# Patient Record
Sex: FEMALE | Race: BLACK OR AFRICAN AMERICAN | Employment: FULL TIME | URBAN - METROPOLITAN AREA
[De-identification: names, ages, dates, MRNs, and addresses within clinical notes are randomized per-mention and may not be internally consistent; named-entity substitution may affect disease eponyms.]

---

## 2022-01-28 ENCOUNTER — HOSPITAL ENCOUNTER (OUTPATIENT)
Dept: PREADMISSION TESTING | Age: 57
Discharge: HOME | End: 2022-01-28
Attending: INTERNAL MEDICINE
Payer: COMMERCIAL

## 2022-01-28 ENCOUNTER — TRANSCRIBE ORDER (OUTPATIENT)
Dept: REGISTRATION | Age: 57
End: 2022-01-28

## 2022-01-28 DIAGNOSIS — U07.1 COVID-19: ICD-10-CM

## 2022-01-28 DIAGNOSIS — U07.1 COVID-19: Primary | ICD-10-CM

## 2022-01-28 LAB
SARS-COV-2, XPLCVT: NOT DETECTED
SOURCE, COVRS: NORMAL

## 2022-01-28 PROCEDURE — U0005 INFEC AGEN DETEC AMPLI PROBE: HCPCS

## 2022-02-02 ENCOUNTER — ANESTHESIA (OUTPATIENT)
Dept: ENDOSCOPY | Age: 57
End: 2022-02-02
Payer: COMMERCIAL

## 2022-02-02 ENCOUNTER — HOSPITAL ENCOUNTER (OUTPATIENT)
Age: 57
Setting detail: OUTPATIENT SURGERY
Discharge: HOME | End: 2022-02-02
Attending: INTERNAL MEDICINE | Admitting: INTERNAL MEDICINE
Payer: COMMERCIAL

## 2022-02-02 ENCOUNTER — ANESTHESIA EVENT (OUTPATIENT)
Dept: ENDOSCOPY | Age: 57
End: 2022-02-02
Payer: COMMERCIAL

## 2022-02-02 VITALS
WEIGHT: 265 LBS | TEMPERATURE: 99.1 F | HEART RATE: 99 BPM | OXYGEN SATURATION: 97 % | SYSTOLIC BLOOD PRESSURE: 130 MMHG | DIASTOLIC BLOOD PRESSURE: 89 MMHG | BODY MASS INDEX: 46.95 KG/M2 | RESPIRATION RATE: 16 BRPM | HEIGHT: 63 IN

## 2022-02-02 PROCEDURE — 76060000031 HC ANESTHESIA FIRST 0.5 HR: Performed by: INTERNAL MEDICINE

## 2022-02-02 PROCEDURE — 2709999900 HC NON-CHARGEABLE SUPPLY: Performed by: INTERNAL MEDICINE

## 2022-02-02 PROCEDURE — 74011250636 HC RX REV CODE- 250/636: Performed by: NURSE ANESTHETIST, CERTIFIED REGISTERED

## 2022-02-02 PROCEDURE — 88305 TISSUE EXAM BY PATHOLOGIST: CPT

## 2022-02-02 PROCEDURE — 76040000019: Performed by: INTERNAL MEDICINE

## 2022-02-02 PROCEDURE — 77030029384 HC SNR POLYP CAPTVR II BSC -B: Performed by: INTERNAL MEDICINE

## 2022-02-02 PROCEDURE — 74011000250 HC RX REV CODE- 250: Performed by: NURSE ANESTHETIST, CERTIFIED REGISTERED

## 2022-02-02 RX ORDER — FLUMAZENIL 0.1 MG/ML
0.2 INJECTION INTRAVENOUS
Status: DISCONTINUED | OUTPATIENT
Start: 2022-02-02 | End: 2022-02-02 | Stop reason: HOSPADM

## 2022-02-02 RX ORDER — PROPOFOL 10 MG/ML
INJECTION, EMULSION INTRAVENOUS AS NEEDED
Status: DISCONTINUED | OUTPATIENT
Start: 2022-02-02 | End: 2022-02-02 | Stop reason: HOSPADM

## 2022-02-02 RX ORDER — DEXTROMETHORPHAN/PSEUDOEPHED 2.5-7.5/.8
1.2 DROPS ORAL
Status: DISCONTINUED | OUTPATIENT
Start: 2022-02-02 | End: 2022-02-02 | Stop reason: HOSPADM

## 2022-02-02 RX ORDER — EPINEPHRINE 0.1 MG/ML
1 INJECTION INTRACARDIAC; INTRAVENOUS
Status: DISCONTINUED | OUTPATIENT
Start: 2022-02-02 | End: 2022-02-02 | Stop reason: HOSPADM

## 2022-02-02 RX ORDER — SODIUM CHLORIDE 0.9 % (FLUSH) 0.9 %
5-40 SYRINGE (ML) INJECTION EVERY 8 HOURS
Status: DISCONTINUED | OUTPATIENT
Start: 2022-02-02 | End: 2022-02-02 | Stop reason: HOSPADM

## 2022-02-02 RX ORDER — ATROPINE SULFATE 0.1 MG/ML
0.5 INJECTION INTRAVENOUS
Status: DISCONTINUED | OUTPATIENT
Start: 2022-02-02 | End: 2022-02-02 | Stop reason: HOSPADM

## 2022-02-02 RX ORDER — SODIUM CHLORIDE 9 MG/ML
50 INJECTION, SOLUTION INTRAVENOUS CONTINUOUS
Status: DISCONTINUED | OUTPATIENT
Start: 2022-02-02 | End: 2022-02-02 | Stop reason: HOSPADM

## 2022-02-02 RX ORDER — LIDOCAINE HYDROCHLORIDE 20 MG/ML
INJECTION, SOLUTION EPIDURAL; INFILTRATION; INTRACAUDAL; PERINEURAL AS NEEDED
Status: DISCONTINUED | OUTPATIENT
Start: 2022-02-02 | End: 2022-02-02 | Stop reason: HOSPADM

## 2022-02-02 RX ORDER — LOSARTAN POTASSIUM 25 MG/1
25 TABLET ORAL DAILY
COMMUNITY

## 2022-02-02 RX ORDER — SODIUM CHLORIDE 0.9 % (FLUSH) 0.9 %
5-40 SYRINGE (ML) INJECTION AS NEEDED
Status: DISCONTINUED | OUTPATIENT
Start: 2022-02-02 | End: 2022-02-02 | Stop reason: HOSPADM

## 2022-02-02 RX ORDER — NALOXONE HYDROCHLORIDE 0.4 MG/ML
0.4 INJECTION, SOLUTION INTRAMUSCULAR; INTRAVENOUS; SUBCUTANEOUS
Status: DISCONTINUED | OUTPATIENT
Start: 2022-02-02 | End: 2022-02-02 | Stop reason: HOSPADM

## 2022-02-02 RX ORDER — ATORVASTATIN CALCIUM 10 MG/1
10 TABLET, FILM COATED ORAL DAILY
COMMUNITY

## 2022-02-02 RX ORDER — SODIUM CHLORIDE 9 MG/ML
INJECTION, SOLUTION INTRAVENOUS
Status: DISCONTINUED | OUTPATIENT
Start: 2022-02-02 | End: 2022-02-02 | Stop reason: HOSPADM

## 2022-02-02 RX ADMIN — PROPOFOL 50 MG: 10 INJECTION, EMULSION INTRAVENOUS at 09:19

## 2022-02-02 RX ADMIN — PROPOFOL 50 MG: 10 INJECTION, EMULSION INTRAVENOUS at 09:26

## 2022-02-02 RX ADMIN — PROPOFOL 50 MG: 10 INJECTION, EMULSION INTRAVENOUS at 09:31

## 2022-02-02 RX ADMIN — PROPOFOL 75 MG: 10 INJECTION, EMULSION INTRAVENOUS at 09:17

## 2022-02-02 RX ADMIN — LIDOCAINE HYDROCHLORIDE 50 MG: 20 INJECTION, SOLUTION EPIDURAL; INFILTRATION; INTRACAUDAL; PERINEURAL at 09:17

## 2022-02-02 RX ADMIN — PROPOFOL 25 MG: 10 INJECTION, EMULSION INTRAVENOUS at 09:35

## 2022-02-02 RX ADMIN — SODIUM CHLORIDE: 900 INJECTION, SOLUTION INTRAVENOUS at 09:01

## 2022-02-02 RX ADMIN — PROPOFOL 50 MG: 10 INJECTION, EMULSION INTRAVENOUS at 09:22

## 2022-02-02 NOTE — H&P
Pre-Endoscopy H&P   Chief complaint: screening or surveillance of previous colon polyps    HPI:  Patient presents for procedure. The indication for the procedure, the patient's history and the patient's current medications are reviewed prior to the procedure and that data is reported on the endoscopy note in the chart to which this document is attached. Any significant complaints with regard to organ systems will be noted, and if not mentioned then a review of systems is not contributory. Past Medical History:   Diagnosis Date    High cholesterol     Hypertension       Past Surgical History:   Procedure Laterality Date    HX BUNIONECTOMY Right      Social   Social History     Tobacco Use    Smoking status: Never Smoker    Smokeless tobacco: Not on file   Substance Use Topics    Alcohol use: No      No family history on file. No Known Allergies   Prior to Admission Medications   Prescriptions Last Dose Informant Patient Reported? Taking?   atorvastatin (LIPITOR) 10 mg tablet   Yes Yes   Sig: Take 10 mg by mouth daily. losartan (COZAAR) 25 mg tablet   Yes Yes   Sig: Take 25 mg by mouth daily. Facility-Administered Medications: None       PHYSICAL EXAM:  The patient is examined immediately prior to the procedure. Visit Vitals  BP (!) 175/103   Pulse (!) 104   Temp 99.1 °F (37.3 °C)   Resp 15   Ht 5' 3\" (1.6 m)   Wt 120.2 kg (265 lb)   SpO2 97%   Breastfeeding No   BMI 46.94 kg/m²     Gen: Appears comfortable, no distress. Pulm: comfortable respirations with no abnormal audible breath sounds  CV: heart regular, well perfused  GI: abdomen flat. ASSESSMENT:  Patient here for procedure. The indication for the procedure, the patient's history and the patient's current medications are reviewed prior to the procedure and that data is reported on the endoscopy note in the chart to which this document is attached.     PLAN:  Informed consent discussion held, patient afforded an opportunity to ask questions and all questions answered. After being advised of the risks, benefits, and alternatives, the patient requested that we proceed and indicated so on a written consent form. Will proceed with procedure as planned.   Myrna Haley MD

## 2022-02-02 NOTE — PROCEDURES
[address] [facility].  [address] [facility] [address] [place], [address]  [phone]                              Colonoscopy Procedure Note      Indications:    Screening colonoscopy     :  Jason Herman MD    Staff: [facility]: Hannah HERNANDEZ  Endoscopy RN-1: Georgeanna Lanes, RN    Referring Provider: Eva Palacios MD    Sedation: MAC    Procedure Details:  After informed consent was obtained with all risks and benefits of procedure explained and preoperative exam completed, the patient was taken to the endoscopy suite and placed in the left lateral decubitus position. Upon sequential sedation as per above, a digital rectal exam was performed per below. The Olympus videocolonoscope was inserted in the rectum and carefully advanced to the cecum, which was identified by the ileocecal valve and appendiceal orifice. The quality of preparation was excellent. [place] Bowel Prep Score : 3/ 3/ 3/ 9. The colonoscope was slowly withdrawn with careful evaluation between folds. Retroflexion in the rectum was performed. Findings:   Rectum: normal   Sigmoid: normal   Descending Colon: normal  Transverse Colon: normal  Ascending Colon: 5mm sessile polyp resected completely with cold snare and retrieved  Cecum: normal     Interventions:  1 complete polypectomy were performed using cold snare  and the polyps were  retrieved    Specimen Removed:    ID Type Source Tests Collected by Time Destination   1 : Ascending Colon Polyp Preservative   Ana Montgomery MD 2/2/2022 [id number] [facility]ology       Complications: None. EBL:  none    Impression:    See Postoperative diagnosis above    Recommendations:   - Await pathology evaluation of biopsy / resected tissue  - Resume normal medications. - Resume previous diet. - Recommend repeat colonoscopy in 7 years    Discharge Disposition:  Home in the company of a  when able to ambulate.     Jason Herman MD  2/2/2022  9:41 AM

## 2022-02-02 NOTE — ANESTHESIA PREPROCEDURE EVALUATION
Relevant Problems   No relevant active problems       Anesthetic History   No history of anesthetic complications            Review of Systems / Medical History  Patient summary reviewed, nursing notes reviewed and pertinent labs reviewed    Pulmonary  Within defined limits                 Neuro/Psych   Within defined limits           Cardiovascular    Hypertension          Hyperlipidemia         GI/Hepatic/Renal  Within defined limits              Endo/Other        Morbid obesity     Other Findings              Physical Exam    Airway  Mallampati: II  TM Distance: > 6 cm  Neck ROM: normal range of motion   Mouth opening: Normal     Cardiovascular  Regular rate and rhythm,  S1 and S2 normal,  no murmur, click, rub, or gallop             Dental  No notable dental hx       Pulmonary  Breath sounds clear to auscultation               Abdominal  GI exam deferred       Other Findings            Anesthetic Plan    ASA: 3  Anesthesia type: MAC            Anesthetic plan and risks discussed with: Patient

## 2022-02-02 NOTE — DISCHARGE INSTRUCTIONS
[address] [place]. Angel Epley, MD  [phone]      February 2, 2022    Francisco Chaney  YOB: 1965    COLONOSCOPY DISCHARGE INSTRUCTIONS    If there is redness at IV site you should apply warm compress to area. If redness or soreness persist contact Dr. Angel Epley or your primary care doctor. There may be a slight amount of blood passed from the rectum. Gaseous discomfort may develop, but walking, belching will help relieve this. You may not operate a vehicle for 12 hours  You may not operate machinery or dangerous appliances for rest of today  You may not drink alcoholic beverages for 12 hours  Avoid making any critical decisions for 24 hours    DIET:  You may resume your normal diet, but some patients find that heavy or large meals may lead to indigestion or vomiting. I suggest a light meal as first food intake. MEDICATIONS:  The use of some over-the-counter pain medication may lead to bleeding after colon biopsies or polyp removal.  Tylenol (also called acetaminophen) is safe to take even if you have had colonoscopy with polyp removal.  Based on the procedure you had today you may safely take aspirin or aspirin-like products for the next ten (10) days. ACTIVITY:  You may resume your normal household activities, but it is recommended that you spend the remainder of the day resting -  avoid any strenuous activity. CALL DR. VIEIRA'S OFFICE IF:  Increasing pain, nausea, vomiting  Abdominal distension (swelling)  Significant new or increased bleeding (oral or rectal)  Fever/Chills  Chest pain/shortness of breath                       Additional instructions:   1 polyp removed today, low risk size. Can repeat colonoscopy in 7 years. It was an honor to be your doctor today. Please let me or my office staff know if you have any feedback about today's procedure.     Omari Rock MD, February 2, 2022    Colonoscopy saves lives, and can prevent colon cancer. Everyone aged 48 or older needs colonoscopy.   Tell your family and friends: get the test!

## 2022-02-02 NOTE — PROGRESS NOTES

## 2022-02-02 NOTE — ANESTHESIA POSTPROCEDURE EVALUATION
Procedure(s):  COLONOSCOPY   :-  ENDOSCOPIC POLYPECTOMY. MAC    Anesthesia Post Evaluation        Patient participation: complete - patient participated  Level of consciousness: awake  Pain management: adequate  Airway patency: patent  Anesthetic complications: no  Cardiovascular status: hemodynamically stable  Respiratory status: acceptable  Hydration status: acceptable  Comments: The patient is ready for PACU discharge. Ginger Mckeon DO                   Post anesthesia nausea and vomiting:  controlled      INITIAL Post-op Vital signs:   Vitals Value Taken Time   /59 02/02/22 0945   Temp     Pulse 94 02/02/22 0949   Resp 20 02/02/22 0949   SpO2 94 % 02/02/22 0949   Vitals shown include unvalidated device data.

## 2022-09-15 ENCOUNTER — OFFICE VISIT (OUTPATIENT)
Dept: ORTHOPEDIC SURGERY | Age: 57
End: 2022-09-15
Payer: COMMERCIAL

## 2022-09-15 DIAGNOSIS — M25.561 ACUTE PAIN OF RIGHT KNEE: Primary | ICD-10-CM

## 2022-09-15 DIAGNOSIS — E66.01 MORBID OBESITY WITH BMI OF 45.0-49.9, ADULT (HCC): ICD-10-CM

## 2022-09-15 DIAGNOSIS — M23.203 DEGENERATIVE TEAR OF MEDIAL MENISCUS OF RIGHT KNEE: ICD-10-CM

## 2022-09-15 PROCEDURE — 99204 OFFICE O/P NEW MOD 45 MIN: CPT | Performed by: PHYSICIAN ASSISTANT

## 2022-09-15 NOTE — PROGRESS NOTES
Laila Galindo (: 1965) is a 49 y.o. female, new  patient, here for evaluation of the following chief complaint(s):  Knee Pain         SUBJECTIVE/OBJECTIVE:    Laila Galindo (: 1965) is a 49 y.o. female who presents for evaluation of right knee pain. Symptoms have been present for the past 2 months. Patient thinks symptoms may have begun while dancing at a [employer] dance party. Patient describes pain and swelling in the knee which is gradually improved over the last month. Patient states the knee was quite painful and swollen 2 months ago and had difficulty walking. Patient has been using ice, Tylenol and ibuprofen on an as-needed basis and states her current pain level is rated as a 4/10. States when symptoms initially began her pain level is rated as a 10/10 and she had difficulty walking. Continues to have pain with bending, twisting and stairs. She denies lower extremity numbness, tingling. No flowsheet data found. ROS    The patient denies fevers, chills, chest pain, shortness of breath, nausea, vomiting. Positive for musculoskeletal issues as described in the HPI. Vitals:  Ht 5' 3\" (1.6 m)   Wt 268 lb (121.6 kg)   LMP 10/10/2015   BMI 47.47 kg/m²    Body mass index is 47.47 kg/m². PHYSICAL EXAM:    The patient is alert and oriented x3 and in no acute distress. The patient ambulates with a normal gait without gait aids. Range of motion of the bilateral knees demonstrates full extension with flexion to 110 degrees. There are no instability findings to anterior/posterior or varus/valgus Omontys stress testing at 0, 30, 60 and 90 degrees. There is medial joint line tenderness in the right knee, no joint line tenderness on the left. There is no pain with patellar compression. Negative John, negative Apley maneuver. There is trace effusion present in the right knee. No swelling or erythema noted on the left. There are no rashes or lesions.   Distal pulses 2+ and symmetric. Distal motor and sensation is intact. IMAGING:    XR Results (most recent):  Results from Appointment encounter on 09/15/22    XR KNEES BI MIN 4 V    Narrative  AP standing, lateral and Merchant view digital radiographs of the bilateral knees were obtained in the office today were reviewed and demonstrate moderate erosion of the medial compartment of both knees, right greater than left. There is good preservation of the lateral and patellofemoral compartments. No evidence of of acute bony abnormality. No Known Allergies      Current Outpatient Medications   Medication Sig    losartan (COZAAR) 25 mg tablet Take 25 mg by mouth daily. atorvastatin (LIPITOR) 10 mg tablet Take 10 mg by mouth daily. No current facility-administered medications for this visit. Past Medical History:   Diagnosis Date    High cholesterol     Hypertension           Past Surgical History:   Procedure Laterality Date    COLONOSCOPY N/A 2/2/2022    COLONOSCOPY   :- performed by Jared Medrano MD at [facility] ENDOSCOPY    HX BUNIONECTOMY Right          History reviewed. No pertinent family history. Social History     Tobacco Use    Smoking status: Never    Smokeless tobacco: Never   Substance Use Topics    Alcohol use: No    Drug use: No                 ASSESSMENT/PLAN:      1. Acute pain of right knee  -     XR KNEES BI MIN 4 V; Future  2. Degenerative tear of medial meniscus of right knee  3. Morbid obesity with BMI of 45.0-49.9, adult [facility])      Below is the assessment and plan developed based on review of pertinent history, physical exam, labs, studies, and medications. Have discussed the patients diagnosis and radiographic findings at length and have answered all patient questions to her questions to her satisfaction. Have advised continued use of ice, elevation, OTC NSAIDs and/or Tylenol on an as-needed basis.   Instructed the patient in quad and hamstring strengthening home exercise program for her to work on on her own. Patient declines formal outpatient physical therapy at this time. Will plan on seeing the patient back for reevaluation in 4 to 6 weeks time should symptoms persist or worsen. The patient understands and agrees to the treatment plan as outlined above. Return if symptoms worsen or fail to improve. Dr. Alannah Rankin was available for immediate consult during this encounter. An electronic signature was used to authenticate this note.     -- Sandip Carrion PA-C

## 2022-09-16 VITALS — WEIGHT: 268 LBS | HEIGHT: 63 IN | BODY MASS INDEX: 47.48 KG/M2

## 2022-10-11 ENCOUNTER — OFFICE VISIT (OUTPATIENT)
Dept: ORTHOPEDIC SURGERY | Age: 57
End: 2022-10-11
Payer: COMMERCIAL

## 2022-10-11 VITALS — HEIGHT: 63 IN | BODY MASS INDEX: 47.48 KG/M2 | WEIGHT: 268 LBS

## 2022-10-11 DIAGNOSIS — M25.561 ACUTE PAIN OF RIGHT KNEE: ICD-10-CM

## 2022-10-11 DIAGNOSIS — M17.0 PRIMARY LOCALIZED OSTEOARTHRITIS OF BOTH KNEES: Primary | ICD-10-CM

## 2022-10-11 DIAGNOSIS — M23.203 DEGENERATIVE TEAR OF MEDIAL MENISCUS OF RIGHT KNEE: ICD-10-CM

## 2022-10-11 DIAGNOSIS — E66.01 MORBID OBESITY WITH BMI OF 45.0-49.9, ADULT (HCC): ICD-10-CM

## 2022-10-11 PROCEDURE — 20610 DRAIN/INJ JOINT/BURSA W/O US: CPT | Performed by: PHYSICIAN ASSISTANT

## 2022-10-11 PROCEDURE — 99213 OFFICE O/P EST LOW 20 MIN: CPT | Performed by: PHYSICIAN ASSISTANT

## 2022-10-11 RX ORDER — BUPIVACAINE HYDROCHLORIDE 7.5 MG/ML
2 INJECTION, SOLUTION EPIDURAL; RETROBULBAR ONCE
Status: COMPLETED | OUTPATIENT
Start: 2022-10-11 | End: 2022-10-11

## 2022-10-11 RX ORDER — TRIAMCINOLONE ACETONIDE 40 MG/ML
80 INJECTION, SUSPENSION INTRA-ARTICULAR; INTRAMUSCULAR ONCE
Status: COMPLETED | OUTPATIENT
Start: 2022-10-11 | End: 2022-10-11

## 2022-10-11 RX ADMIN — TRIAMCINOLONE ACETONIDE 80 MG: 40 INJECTION, SUSPENSION INTRA-ARTICULAR; INTRAMUSCULAR at 17:46

## 2022-10-11 RX ADMIN — BUPIVACAINE HYDROCHLORIDE 15 MG: 7.5 INJECTION, SOLUTION EPIDURAL; RETROBULBAR at 17:45

## 2022-10-11 NOTE — PROGRESS NOTES
Rodrigo Macias (: 1965) is a 49 y.o. female, established  patient, here for evaluation of the following chief complaint(s):  Knee Pain         SUBJECTIVE/OBJECTIVE:    Rodrigo Macias (: 1965) is a 49 y.o. female who presents for evaluation of right knee pain. The patient was initially seen about a month ago for bilateral knee pain, right greater than left. He patient states symptoms have been present for the past 3 months and may have begun following a dance party. Patient describes pain and swelling in the right knee. He localizes pain mainly about the medial aspect of the knees. Patient has been taking OTC NSAIDs and/or Tylenol on an as-needed basis and using ice. Patient states that she has been doing home exercises as instructed by myself at her last office visit. Patient continues to have pain with walking and standing. Patient states she has had to restrict her activities secondary to pain. Patient denies lower extremity numbness or tingling. No flowsheet data found. ROS    The patient denies fevers, chills, chest pain, shortness of breath, nausea, vomiting. Positive for musculoskeletal issues as described in the HPI. Vitals:  Ht 5' 3\" (1.6 m)   Wt 268 lb (121.6 kg)   LMP 10/10/2015   BMI 47.47 kg/m²    Body mass index is 47.47 kg/m². PHYSICAL EXAM:    The patient is alert and oriented x3 and in no acute distress. The patient is morbidly obese. The patient ambulates with an antalgic gait on the right without gait aids. Range of motion of the right knee knee demonstrates full extension with flexion to 100 degrees. No instability findings to anterior/posterior or varus/valgus ligamentous stress testing. There is medial joint line tenderness on the right, no joint line tenderness on the left. There is no pain with patellar compression. Positive John on the right. There is no swelling or erythema. There is mild effusion present in the right knee. Distal pulses 2+ and symmetric. Distal motor and sensation is intact. IMAGING:    XR Results (most recent):  Results from Appointment encounter on 09/15/22    XR KNEES BI MIN 4 V    Narrative  AP standing, lateral and Merchant view digital radiographs of the bilateral knees were obtained in the office today were reviewed and demonstrate moderate erosion of the medial compartment of both knees, right greater than left. There is good preservation of the lateral and patellofemoral compartments. No evidence of of acute bony abnormality. No Known Allergies      Current Outpatient Medications   Medication Sig    losartan (COZAAR) 25 mg tablet Take 25 mg by mouth daily. atorvastatin (LIPITOR) 10 mg tablet Take 10 mg by mouth daily. No current facility-administered medications for this visit. Past Medical History:   Diagnosis Date    High cholesterol     Hypertension           Past Surgical History:   Procedure Laterality Date    COLONOSCOPY N/A 2/2/2022    COLONOSCOPY   :- performed by Massiel Neil MD at [facility] ENDOSCOPY    HX BUNIONECTOMY Right          History reviewed. No pertinent family history. Social History     Tobacco Use    Smoking status: Never    Smokeless tobacco: Never   Substance Use Topics    Alcohol use: No    Drug use: No                 ASSESSMENT/PLAN:      1. Primary localized osteoarthritis of both knees  2. Acute pain of right knee  3. Degenerative tear of medial meniscus of right knee  4. Morbid obesity with BMI of 45.0-49.9, adult [facility])    Below is the assessment and plan developed based on review of pertinent history, physical exam, labs, studies, and medications. Have discussed the diagnosis and radiographic findings at length and answered all patient questions to her satisfaction. After discussion of risks, benefits and alternatives the patient has verbally consented to intra-articular steroid injection in the right knee.   A combination of 80 mg triamcinolone and 2 mL of 0.75% bupivacaine was instilled in the right knee in sterile fashion after sterile prep with alcohol and Betadine. The patient tolerated the procedure well. Given the patient's ongoing pain despite conservative management have ordered MRI of the right knee to assess for meniscus tear. Plan on seeing the patient back for follow-up in 3 to 4 weeks time to review imaging. Patient was asked to contact the office by phone with any questions or concerns. The patient understands and agrees to the treatment plan as outlined above. Return in about 4 weeks (around 11/8/2022) for MRI review. Dr. Juliano Gonzalez was available for immediate consult during this encounter. An electronic signature was used to authenticate this note.     -- Mikayla Anderson PA-C

## (undated) DEVICE — SNARE VASC L240CM LOOP W10MM SHTH DIA2.4MM RND STIFF CLD

## (undated) DEVICE — TRAP SURG QUAD PARABOLA SLOT DSGN SFTY SCRN TRAPEASE